# Patient Record
Sex: FEMALE | Race: BLACK OR AFRICAN AMERICAN | NOT HISPANIC OR LATINO | Employment: OTHER | URBAN - METROPOLITAN AREA
[De-identification: names, ages, dates, MRNs, and addresses within clinical notes are randomized per-mention and may not be internally consistent; named-entity substitution may affect disease eponyms.]

---

## 2019-05-09 ENCOUNTER — HOSPITAL ENCOUNTER (OUTPATIENT)
Dept: OTHER | Facility: HOSPITAL | Age: 74
Discharge: HOME | End: 2019-05-09
Attending: INTERNAL MEDICINE | Admitting: INTERNAL MEDICINE

## 2019-05-09 LAB
ALBUMIN SERPL-MCNC: 3.8 G/DL (ref 3.5–4.8)
ALBUMIN/GLOB SERPL: 1.4 {RATIO} (ref 1–1.7)
ALP SERPL-CCNC: 61 IU/L (ref 32–91)
ALT SERPL-CCNC: 19 IU/L (ref 14–54)
ANION GAP SERPL CALC-SCNC: 12.5 MMOL/L (ref 10–20)
AST SERPL-CCNC: 22 IU/L (ref 15–41)
BILIRUB SERPL-MCNC: 0.9 MG/DL (ref 0.3–1.2)
BUN SERPL-MCNC: 10 MG/DL (ref 8–20)
BUN/CREAT SERPL: 14.3 (ref 5.4–26.2)
CALCIUM SERPL-MCNC: 8.9 MG/DL (ref 8.9–10.3)
CHLORIDE SERPL-SCNC: 107 MMOL/L (ref 101–111)
CONV CO2: 23 MMOL/L (ref 22–32)
CONV TOTAL PROTEIN: 6.6 G/DL (ref 6.1–7.9)
CREAT UR-MCNC: 0.7 MG/DL (ref 0.4–1)
GLOBULIN UR ELPH-MCNC: 2.8 G/DL (ref 2.5–3.8)
GLUCOSE SERPL-MCNC: 100 MG/DL (ref 65–99)
HBA1C MFR BLD: 5.6 % (ref 0–5.6)
POTASSIUM SERPL-SCNC: 4.5 MMOL/L (ref 3.6–5.1)
SODIUM SERPL-SCNC: 138 MMOL/L (ref 136–144)
T4 FREE SERPL-MCNC: 1.18 NG/DL (ref 0.58–1.64)
TSH SERPL-ACNC: 1.58 UIU/ML (ref 0.34–5.6)

## 2019-05-10 LAB — THYROPEROXIDASE AB SERPL-ACNC: <1 [IU]/ML (ref 0–9)

## 2019-05-31 ENCOUNTER — CONVERSION ENCOUNTER (OUTPATIENT)
Dept: ENDOCRINOLOGY | Facility: CLINIC | Age: 74
End: 2019-05-31

## 2019-06-01 ENCOUNTER — TRANSCRIBE ORDERS (OUTPATIENT)
Dept: ADMINISTRATIVE | Facility: HOSPITAL | Age: 74
End: 2019-06-01

## 2019-06-01 DIAGNOSIS — E04.1 THYROID NODULE: Primary | ICD-10-CM

## 2019-06-04 VITALS
SYSTOLIC BLOOD PRESSURE: 122 MMHG | WEIGHT: 211 LBS | DIASTOLIC BLOOD PRESSURE: 70 MMHG | OXYGEN SATURATION: 98 % | HEART RATE: 89 BPM | HEIGHT: 67 IN | BODY MASS INDEX: 33.12 KG/M2

## 2019-06-06 NOTE — PROGRESS NOTES
Visit Type:  Follow-up Visit  Referring Provider:  Fede Kirk MD  Primary Provider:  Fede Kirk MD    CC:  thyroid nodule .    History of Present Illness:   73-year-old female seen here for multinodular goiter.  she was last seen in March of 2017, she has multiple thyroid nodules and 1 of them did show it was suspicious for her thought cell.  She was referred to Dr. CHAN for further evaluation unfortunately she   had some Lukas units in the life and she could not follow with Dr. CHAN.  She is now back to resume the care for her thyroid.  She is not taking any thyroid medications at this time.  No recent labs are thyroid ultrasound has been done.  She denies any   dysphagia or choking.  She denies any change in the voice or hoarseness.  No history of radiation exposure to her neck either.    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  She was referred to ENT specialist Dr. CHAN.     Thyroid ultrasound done in May of 2019 showed a right side thyroid nodule 1.6 cm and the left of 1.4 cm.  They looked nonsuspicious.  Her thyroid function test was normal.      Standards of Care   no  Last Eye Exam: 2017  Influenza vaccine: 2019  Pneumovax: 2019      Past Medical History:     Reviewed history from 04/30/2019 and no changes required:        none        Weight gained         Right arm sleeping feeling     Past Surgical History:     Reviewed history from 02/28/2017 and no changes required:        thoracic    Family History Summary:      Reviewed history Last on 04/30/2019 and no changes required:05/31/2019  Sister - Has FH Stroke - Entered On: 2/28/2017  Mother - Has FH Lung Cancer - Entered On: 2/28/2017      Social History:     Reviewed history from 02/28/2017 and no changes required:        Marital Status         Children 2        Occupation retired        Risk Factors:     Smoked Tobacco  Use:  Former smoker     Cigarettes:  Yes      Pack-years:  45        Year quit:  2019        Years Since Last Quit:  0   Alcohol use:  no    Previous Tobacco Use: Signed On - 2019  Smoked Tobacco Use:  Former smoker     Cigarettes:  Yes      Pack-years:  45        Year quit:  2019        Years Since Last Quit:  0 years, 4 months, 30 days     Counseled to quit/cut down:  yes    Previous Alcohol Use: Signed On 2019  Alcohol use:  no    Current Allergies (reviewed today):  No known allergies    Current Medications (including medications started today):   None      Review of Systems     General       Denies sweating and fatigue.    ENT       Denies difficulty swallowing.    CV       Denies chest pain or discomfort and shortness of breath with exertion.    MS       Denies muscle cramps and muscle aches.    Derm       Denies dryness.    Neuro       Denies headaches.    Psych       Denies anxiety and depression.    Endo       Complains of weight change.       Denies cold intolerance and heat intolerance.      Vital Signs:    Patient Profile:    73 Years Old Female  Height:     67 inches  Weight:     211 pounds  BMI:        33.04     O2 Sat:     98 %  Pulse rate: 89 / minute  BP Sittin / 70  (left arm)    Cuff size:  large      Problems: Active problems were reviewed with the patient during this visit.  Medications: Medications were reviewed with the patient during this visit.  Allergies: Allergies were reviewed with the patient during this visit.  No Known Medication.  No Known Allergy.        Vitals Entered By: Jocelyn Ruiz MA (May 31, 2019 12:50 PM)      Physical Exam    General: Well developed, well nourished, NAD  Eyes: Pink conjunctivae, No ptosis.   Neck: No masses, No thyromegaly, trachea midline.  Lungs: CTA with normal respiratory effort  CV: RRR, no murmur rub or gallop.  Musculoskeletal: Normal gait and station. No digital cyanosis.  Extremities: No clubbing, cyanosis, edema, or deformity.    Neurologic: No focal deficits. Cranial nerves intact.  Skin: Warm and dry, No lesions or rashes  Psych: AAOx3 with appropraite affect        Blood Pressure:  Today's BP: 122/70 mm Hg    Labwork:   Most Recent Lab Results:   HbA1c: : 5.6 % 05/09/2019      Impression & Recommendations:    Problem # 1:  Multiple thyroid nodules (ICD-241.1) (WWY33-C62.2)  She is euthyroid, she is a status post biopsy of 1 left side thyroid nodule and 2-0 right side thyroid nodule.  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   Repeat ultrasound in May 2019   did show right and the left side nodule however they were nonsuspicious.  Too small for biopsy at this time.  Recommend to follow-up ultrasound in 1 year.  Orders:  Ofc Vst, Est Level III (53804)  US THYROID (CPT-69515)      Problem # 2:  Hyperglycemia (ICD-790.29) (MBE91-T96.9)    A1c normal.  Orders:  Ofc Vst, Est Level III (70238)  US THYROID (CPT-[id number])        Patient Instructions:  1)  Please schedule a follow-up appointment in 1 year.  2)    Please arrange thyroid ultrasound before follow-up in 1 year                    Medication Administration    Orders Added:  1)  Ofc Vst, Est Level III [51159]  2)  US THYROID [CPT-[id number]]  ]      Electronically signed by Sarahi Medel MD on 05/31/2019 at 12:59 PM  ________________________________________________________________________       Disclaimer: Converted Note message may not contain all data elements that existed in the legacy source system. Please see [employer] [facility] for the original note details.

## 2020-04-29 DIAGNOSIS — E04.2 MULTIPLE THYROID NODULES: Primary | ICD-10-CM

## 2020-04-29 PROBLEM — R73.9 HYPERGLYCEMIA: Status: ACTIVE | Noted: 2017-04-04

## 2020-04-29 PROBLEM — Z82.3 FAMILY HISTORY OF STROKE: Status: ACTIVE | Noted: 2020-04-29

## 2020-04-29 PROBLEM — Z80.1 FAMILY HISTORY OF MALIGNANT NEOPLASM OF TRACHEA, BRONCHUS AND LUNG: Status: ACTIVE | Noted: 2020-04-29

## 2020-05-06 ENCOUNTER — HOSPITAL ENCOUNTER (OUTPATIENT)
Dept: ULTRASOUND IMAGING | Facility: HOSPITAL | Age: 75
End: 2020-05-06

## 2022-06-16 ENCOUNTER — OFFICE (AMBULATORY)
Dept: URBAN - METROPOLITAN AREA PATHOLOGY 4 | Facility: PATHOLOGY | Age: 77
End: 2022-06-16

## 2022-06-16 ENCOUNTER — ON CAMPUS - OUTPATIENT (AMBULATORY)
Dept: URBAN - METROPOLITAN AREA HOSPITAL 2 | Facility: HOSPITAL | Age: 77
End: 2022-06-16

## 2022-06-16 ENCOUNTER — OFFICE (AMBULATORY)
Dept: URBAN - METROPOLITAN AREA PATHOLOGY 4 | Facility: PATHOLOGY | Age: 77
End: 2022-06-16
Payer: COMMERCIAL

## 2022-06-16 VITALS
WEIGHT: 212 LBS | OXYGEN SATURATION: 97 % | HEART RATE: 78 BPM | OXYGEN SATURATION: 99 % | RESPIRATION RATE: 16 BRPM | SYSTOLIC BLOOD PRESSURE: 161 MMHG | DIASTOLIC BLOOD PRESSURE: 51 MMHG | SYSTOLIC BLOOD PRESSURE: 110 MMHG | HEART RATE: 75 BPM | DIASTOLIC BLOOD PRESSURE: 60 MMHG | SYSTOLIC BLOOD PRESSURE: 128 MMHG | TEMPERATURE: 97.8 F | DIASTOLIC BLOOD PRESSURE: 57 MMHG | SYSTOLIC BLOOD PRESSURE: 119 MMHG | HEART RATE: 73 BPM | DIASTOLIC BLOOD PRESSURE: 58 MMHG | SYSTOLIC BLOOD PRESSURE: 105 MMHG | SYSTOLIC BLOOD PRESSURE: 112 MMHG | HEART RATE: 84 BPM | DIASTOLIC BLOOD PRESSURE: 98 MMHG | OXYGEN SATURATION: 98 % | SYSTOLIC BLOOD PRESSURE: 123 MMHG | HEIGHT: 67 IN | DIASTOLIC BLOOD PRESSURE: 101 MMHG | RESPIRATION RATE: 17 BRPM | HEART RATE: 72 BPM | HEART RATE: 70 BPM | OXYGEN SATURATION: 95 %

## 2022-06-16 DIAGNOSIS — D12.2 BENIGN NEOPLASM OF ASCENDING COLON: ICD-10-CM

## 2022-06-16 DIAGNOSIS — D12.5 BENIGN NEOPLASM OF SIGMOID COLON: ICD-10-CM

## 2022-06-16 DIAGNOSIS — Z86.010 PERSONAL HISTORY OF COLONIC POLYPS: ICD-10-CM

## 2022-06-16 DIAGNOSIS — K64.1 SECOND DEGREE HEMORRHOIDS: ICD-10-CM

## 2022-06-16 PROBLEM — K63.5 POLYP OF COLON: Status: ACTIVE | Noted: 2022-06-16

## 2022-06-16 LAB
GI HISTOLOGY: A. UNSPECIFIED: (no result)
GI HISTOLOGY: B. UNSPECIFIED: (no result)
GI HISTOLOGY: PDF REPORT: (no result)

## 2022-06-16 PROCEDURE — 45380 COLONOSCOPY AND BIOPSY: CPT | Mod: PT | Performed by: INTERNAL MEDICINE

## 2022-06-16 PROCEDURE — 88305 TISSUE EXAM BY PATHOLOGIST: CPT | Mod: 26 | Performed by: INTERNAL MEDICINE

## 2023-12-31 ENCOUNTER — HOSPITAL ENCOUNTER (OUTPATIENT)
Facility: HOSPITAL | Age: 78
Discharge: HOME | End: 2023-12-31
Attending: EMERGENCY MEDICINE | Admitting: EMERGENCY MEDICINE
Payer: MEDICARE

## 2023-12-31 VITALS
SYSTOLIC BLOOD PRESSURE: 170 MMHG | OXYGEN SATURATION: 96 % | BODY MASS INDEX: 33.12 KG/M2 | HEIGHT: 67 IN | HEART RATE: 85 BPM | DIASTOLIC BLOOD PRESSURE: 81 MMHG | WEIGHT: 211 LBS | RESPIRATION RATE: 16 BRPM | TEMPERATURE: 97.3 F

## 2023-12-31 DIAGNOSIS — K08.89 PAIN, DENTAL: Primary | ICD-10-CM

## 2023-12-31 PROCEDURE — 63710000001 PREDNISONE PER 1 MG: Performed by: EMERGENCY MEDICINE

## 2023-12-31 PROCEDURE — G0463 HOSPITAL OUTPT CLINIC VISIT: HCPCS | Performed by: EMERGENCY MEDICINE

## 2023-12-31 RX ORDER — PREDNISONE 20 MG/1
20 TABLET ORAL ONCE
Status: COMPLETED | OUTPATIENT
Start: 2023-12-31 | End: 2023-12-31

## 2023-12-31 RX ORDER — ALBUTEROL SULFATE 90 UG/1
2 AEROSOL, METERED RESPIRATORY (INHALATION) EVERY 4 HOURS PRN
Qty: 8 G | Refills: 0 | Status: SHIPPED | OUTPATIENT
Start: 2023-12-31

## 2023-12-31 RX ORDER — IPRATROPIUM BROMIDE AND ALBUTEROL SULFATE 2.5; .5 MG/3ML; MG/3ML
3 SOLUTION RESPIRATORY (INHALATION) ONCE
Status: COMPLETED | OUTPATIENT
Start: 2023-12-31 | End: 2023-12-31

## 2023-12-31 RX ORDER — CHLORHEXIDINE GLUCONATE ORAL RINSE 1.2 MG/ML
15 SOLUTION DENTAL 4 TIMES DAILY
Qty: 473 ML | Refills: 0 | Status: SHIPPED | OUTPATIENT
Start: 2023-12-31

## 2023-12-31 RX ORDER — PREDNISONE 20 MG/1
20 TABLET ORAL DAILY
Qty: 5 TABLET | Refills: 0 | Status: SHIPPED | OUTPATIENT
Start: 2023-12-31

## 2023-12-31 RX ORDER — AMOXICILLIN 500 MG/1
500 CAPSULE ORAL 2 TIMES DAILY
Qty: 14 CAPSULE | Refills: 0 | Status: SHIPPED | OUTPATIENT
Start: 2023-12-31 | End: 2024-01-07

## 2023-12-31 RX ADMIN — IPRATROPIUM BROMIDE AND ALBUTEROL SULFATE 3 ML: .5; 3 SOLUTION RESPIRATORY (INHALATION) at 10:29

## 2023-12-31 RX ADMIN — PREDNISONE 20 MG: 20 TABLET ORAL at 10:29

## 2023-12-31 NOTE — [facility] PROVIDER NOTE
Subjective   History of Present Illness  78-year-old female coming in for dental pain patient states that she felt a lump in her mouth had some pus come out and noticed some drainage and pain around the gum on the right bottom canine tooth also states she has had some wheeze for the past few days no cough no shortness of breath no fevers or chills        Review of Systems   Constitutional:  Negative for activity change and appetite change.   HENT:  Positive for dental problem. Negative for congestion, rhinorrhea and sinus pressure.    Respiratory:  Positive for wheezing. Negative for cough and shortness of breath.    Cardiovascular:  Negative for chest pain and leg swelling.   Gastrointestinal:  Negative for abdominal pain.   Genitourinary:  Negative for difficulty urinating and dysuria.   Neurological:  Negative for dizziness and headaches.   All other systems reviewed and are negative.      No past medical history on file.    No Known Allergies    No past surgical history on file.    No family history on file.    Social History     Socioeconomic History    Marital status:            Objective   Physical Exam  Vitals and nursing note reviewed.   Constitutional:       General: She is not in acute distress.     Appearance: She is well-developed. She is not ill-appearing.   HENT:      Head: Normocephalic and atraumatic.      Right Ear: Tympanic membrane normal.      Left Ear: Tympanic membrane normal.      Nose: No congestion or rhinorrhea.      Mouth/Throat:      Comments: Erythema of the gingiva localized to bottom right canine no trismus no signs of necrotic gums foul-smelling breath  Pulmonary:      Effort: Pulmonary effort is normal. No respiratory distress.      Breath sounds: Normal breath sounds. No stridor.      Comments: Wheezes heard bilaterally on expiration  Neurological:      Mental Status: She is alert.         Procedures           ED Course                                           Medical  Decision Making  78-year-old female coming in for dental pain found to have pulpitis on exam patient also endorses mild wheezing given breathing treatment with resolution of symptoms discharged home with amoxicillin chlorhexidine albuterol and steroids    Risk  Prescription drug management.        Final diagnoses:   None       ED Disposition  ED Disposition       None            No follow-up provider specified.       Medication List      No changes were made to your prescriptions during this visit.